# Patient Record
Sex: MALE | Race: WHITE | NOT HISPANIC OR LATINO | Employment: OTHER | URBAN - METROPOLITAN AREA
[De-identification: names, ages, dates, MRNs, and addresses within clinical notes are randomized per-mention and may not be internally consistent; named-entity substitution may affect disease eponyms.]

---

## 2021-08-17 ENCOUNTER — OFFICE VISIT (OUTPATIENT)
Dept: OTOLARYNGOLOGY | Facility: CLINIC | Age: 81
End: 2021-08-17
Payer: MEDICARE

## 2021-08-17 ENCOUNTER — OFFICE VISIT (OUTPATIENT)
Dept: AUDIOLOGY | Facility: CLINIC | Age: 81
End: 2021-08-17
Payer: MEDICARE

## 2021-08-17 VITALS — TEMPERATURE: 98.1 F

## 2021-08-17 DIAGNOSIS — H90.3 SENSORINEURAL HEARING LOSS (SNHL), BILATERAL: Primary | ICD-10-CM

## 2021-08-17 DIAGNOSIS — H90.3 SENSORY HEARING LOSS, BILATERAL: Primary | ICD-10-CM

## 2021-08-17 DIAGNOSIS — H61.23 BILATERAL IMPACTED CERUMEN: ICD-10-CM

## 2021-08-17 PROCEDURE — V5241 DISPENSING FEE, MONAURAL: HCPCS | Performed by: AUDIOLOGIST

## 2021-08-17 PROCEDURE — 92567 TYMPANOMETRY: CPT | Performed by: AUDIOLOGIST

## 2021-08-17 PROCEDURE — 69210 REMOVE IMPACTED EAR WAX UNI: CPT | Performed by: NURSE PRACTITIONER

## 2021-08-17 PROCEDURE — V5257 HEARING AID, DIGIT, MON, BTE: HCPCS | Performed by: AUDIOLOGIST

## 2021-08-17 PROCEDURE — 99203 OFFICE O/P NEW LOW 30 MIN: CPT | Performed by: NURSE PRACTITIONER

## 2021-08-17 PROCEDURE — 92557 COMPREHENSIVE HEARING TEST: CPT | Performed by: AUDIOLOGIST

## 2021-08-17 NOTE — PROGRESS NOTES
ENT HEARING EVALUATION    Name:  Natalya White  :  1940  Age:  78 y o  Date of Evaluation: 21     History: ENT Audiogram  Reason for visit: Natalya White was seen today at the request of Dr Sergei Price for an evaluation of hearing  Impacting cerumen was removed from the right ear canal by TUNDE Landa prior to the evaluation  EVALUATION:    Otoscopic Evaluation:   Right Ear: Clear and healthy ear canal and tympanic membrane   Left Ear: Clear and healthy ear canal and tympanic membrane    Tympanometry:   Right: Type A - normal middle ear pressure and compliance   Left: Type A - normal middle ear pressure and compliance    Audiogram Results:  Pure tone testing revealed a severe sloping to profound sensorineural hearing loss in the  right ear and a moderate sloping to severe sensorineural hearing loss in the  left  ear  SRT and PTA are in agreement indicating good test reliability  Word recognition scores were poor bilaterally  This hearing loss is poor enough to interfere with Mr Jean's everyday hearing in all listenting environments  The patient and his wife were very eager to get started with the process for obtaining a hearing aid  Options for hearing aids including prices and colors were discussed  An salgomed MORE 3 miniRITE hearing aid was ordered for the patient's left ear  It is believed that the right ear hearing is not sufficient enough to benefit from a hearing aid at this time  *see attached audiogram      RECOMMENDATIONS:  Hearing Aid fitting in two weeks  Consistent use of left ear hearing aid during all waking hours  Regular hearing aid follow-up and audiologic evaluations to monitor hearing sensitivity and hearing aid use        Connie Woodard , [address] [place] #[id number]

## 2021-08-17 NOTE — PROGRESS NOTES
Assessment/Plan:    Bilateral impacted cerumen    On exam noted bilateral cerumen impaction and unable to fully view tympanic membrane  Left minimal cerumen along eac walls, right cerumen impaction significant  Cerumen impaction removed bilateral eac with alligator forceps and suction, pt tolerated procedure well  Upon removal, improved hearing and decreased clogged sensation of bilateral ears  Discussed routine cerumen care including avoidance of q-tips and cerumen softeners  Encourage ongoing follow up annually to monitor for cerumen and hearing  Sensorineural hearing loss (SNHL), bilateral  Audiogram today indicating bilateral moderate to near profound SNHL, noted asymmetry on right  Tymps type A  Unable to test word discrimination  Recommend and he agreed to proceed with hearing aid evaluation through [address] [place] [address] audiology         Diagnoses and all orders for this visit:    Sensorineural hearing loss (SNHL), bilateral    Bilateral impacted cerumen  -     Ear cerumen removal          Subjective:      Patient ID: Jason Esparza is a 78 y o  male  Presents today as a new patient due to hearing loss  During routine visit with audiology today informed of cerumen in right ear  No current hearing aids  No pain in ears  Noise in ears  Unable to understand spouse  No otorrhea  No prior ear surgery  The following portions of the patient's history were reviewed and updated as appropriate: allergies, current medications, past family history, past medical history, past social history, past surgical history and problem list     Review of Systems   Constitutional: Negative  HENT: Positive for hearing loss  Negative for congestion, ear discharge, ear pain, nosebleeds, postnasal drip, rhinorrhea, sinus pressure, sinus pain, sore throat, tinnitus and voice change  Eyes: Negative  Respiratory: Negative for chest tightness and shortness of breath  Cardiovascular: Negative  Gastrointestinal: Negative  Endocrine: Negative  Musculoskeletal: Negative  Skin: Negative for color change  Neurological: Negative for dizziness, numbness and headaches  Psychiatric/Behavioral: Negative  Objective:      Temp 98 1 °F (36 7 °C)          Physical Exam  Constitutional:       Appearance: He is well-developed  HENT:      Head: Normocephalic  Right Ear: Hearing, tympanic membrane, ear canal and external ear normal  No decreased hearing noted  No drainage or tenderness  There is impacted cerumen  Tympanic membrane is not perforated or erythematous  Left Ear: Hearing, tympanic membrane, ear canal and external ear normal  No decreased hearing noted  No drainage or tenderness  There is impacted cerumen  Tympanic membrane is not perforated or erythematous  Nose: Nose normal  No nasal deformity or septal deviation  Mouth/Throat:      Mouth: Mucous membranes are not pale and not dry  No oral lesions  Dentition: Normal dentition  Pharynx: Uvula midline  No oropharyngeal exudate  Neck:      Trachea: No tracheal deviation  Cardiovascular:      Rate and Rhythm: Normal rate  Pulmonary:      Effort: Pulmonary effort is normal  No accessory muscle usage or respiratory distress  Musculoskeletal:      Right shoulder: Normal range of motion  Cervical back: Full passive range of motion without pain, normal range of motion and neck supple  Lymphadenopathy:      Cervical: No cervical adenopathy  Skin:     General: Skin is warm and dry  Neurological:      Mental Status: He is alert and oriented to person, place, and time  Cranial Nerves: No cranial nerve deficit  Sensory: No sensory deficit  Psychiatric:         Behavior: Behavior is cooperative  Ear cerumen removal    Date/Time: 8/17/2021 2:20 PM  Performed by: TUNDE Adkins  Authorized by: TUNDE Adkins   Universal Protocol:  Consent: Verbal consent obtained    Risks and benefits: risks, benefits and alternatives were discussed  Consent given by: patient  Patient understanding: patient states understanding of the procedure being performed      Patient location:  Clinic  Procedure details:     Local anesthetic:  None    Location:  L ear and R ear    Approach:  External  Post-procedure details:     Complication:  None    Hearing quality:  Normal    Patient tolerance of procedure:   Tolerated well, no immediate complications  Comments:      Bilateral cerumen impaction removed with suction #5, curette and alligator forceps - right more then left cerumen

## 2021-08-17 NOTE — ASSESSMENT & PLAN NOTE
On exam noted bilateral cerumen impaction and unable to fully view tympanic membrane  Left minimal cerumen along eac walls, right cerumen impaction significant  Cerumen impaction removed bilateral eac with alligator forceps and suction, pt tolerated procedure well  Upon removal, improved hearing and decreased clogged sensation of bilateral ears  Discussed routine cerumen care including avoidance of q-tips and cerumen softeners  Encourage ongoing follow up annually to monitor for cerumen and hearing

## 2021-08-17 NOTE — ASSESSMENT & PLAN NOTE
Audiogram today indicating bilateral moderate to near profound SNHL, noted asymmetry on right  Tymps type A  Unable to test word discrimination      Recommend and he agreed to proceed with hearing aid evaluation through [facility] audiology

## 2021-08-18 NOTE — PROGRESS NOTES
Progress Note    Name:  Dimas Panda  :  1940  Age:  78 y o  Date of Evaluation: 21     Patients hearing aid arrived  Oticon More 3 miniRITE-R  SN: [id number]  Warranty: 2024     SN: [id number]  Warranty: 2024    Patient scheduled for  with Yoselin Durant , CCC-A  Clinical Audiologist

## 2021-08-24 ENCOUNTER — DOCUMENTATION (OUTPATIENT)
Dept: AUDIOLOGY | Facility: CLINIC | Age: 81
End: 2021-08-24

## 2021-08-24 DIAGNOSIS — H90.3 SENSORY HEARING LOSS, BILATERAL: Primary | ICD-10-CM

## 2021-08-24 NOTE — PROGRESS NOTES
Progress Note    Name:  Zia Hernandez  :  1940  Age:  78 y o  Date of Evaluation: 21     Thomas's hearing aid arrived  Make: Oticon  Model: MORE 3 miniRITE  SN: [id number]  Battery: 312 R  : 3L 100  Dome:10 mm   SN: [id number]    Patient coming for [facility] 2021        Connie Emanuel , CCC-A  Clinical Audiologist  FR#[id number]

## 2021-08-25 ENCOUNTER — OFFICE VISIT (OUTPATIENT)
Dept: AUDIOLOGY | Facility: CLINIC | Age: 81
End: 2021-08-25

## 2021-08-25 DIAGNOSIS — H90.3 SENSORY HEARING LOSS, BILATERAL: Primary | ICD-10-CM

## 2021-08-25 NOTE — PROGRESS NOTES
Hearing Aid Fitting    Name:  Kimmy De La Vega  :  1940  Age:  78 y o  Date of Evaluation: 21     Kimmy De La Vega is being see today to be fit with new hearing aids  Patient is fit with OtRateItAll MORE 3 hearing aid  Left serial number [id number]  Warranty date: 24 (Loss/Damage and repair)  Ear mold Battery  Dome   Right NA NA NA NA   Left  R 3/100 10mm P     The hearing aid was adjusted based on the patient's most recent audiogram and patient comfort  Patient noted good sound quality, and was happy with the fitting  Care and cleaning of the hearing aids was reviewed  Domes, filters, and batteries and user manual were reviewed with the patient  Insertion and removal of the hearing aids was done  The patient practiced insertion and removal of the devices in the office, they demonstrated good ability to manipulate the hearing aids  Telephone use was reviewed with the patient  The patient expressed satisfaction with the hearing aid  Recommendation:   Follow up in two weeks         Connie Kramer , CCC-A  Clinical Audiologist  OE#[id number]

## 2021-08-30 ENCOUNTER — OFFICE VISIT (OUTPATIENT)
Dept: AUDIOLOGY | Facility: CLINIC | Age: 81
End: 2021-08-30

## 2021-08-30 ENCOUNTER — OFFICE VISIT (OUTPATIENT)
Dept: OTOLARYNGOLOGY | Facility: CLINIC | Age: 81
End: 2021-08-30
Payer: MEDICARE

## 2021-08-30 VITALS — WEIGHT: 170 LBS | BODY MASS INDEX: 23.03 KG/M2 | HEIGHT: 72 IN | TEMPERATURE: 98 F

## 2021-08-30 DIAGNOSIS — T16.2XXA FOREIGN BODY OF LEFT EAR, INITIAL ENCOUNTER: Primary | ICD-10-CM

## 2021-08-30 DIAGNOSIS — H90.5 SENSORY HEARING LOSS, UNILATERAL: Primary | ICD-10-CM

## 2021-08-30 DIAGNOSIS — H90.3 SENSORINEURAL HEARING LOSS (SNHL), BILATERAL: ICD-10-CM

## 2021-08-30 PROCEDURE — 99212 OFFICE O/P EST SF 10 MIN: CPT | Performed by: NURSE PRACTITIONER

## 2021-08-30 PROCEDURE — 69200 CLEAR OUTER EAR CANAL: CPT | Performed by: NURSE PRACTITIONER

## 2021-08-30 RX ORDER — FINASTERIDE 5 MG/1
TABLET, FILM COATED ORAL
COMMUNITY
Start: 2021-08-08

## 2021-08-30 RX ORDER — PRAVASTATIN SODIUM 20 MG
20 TABLET ORAL EVERY EVENING
COMMUNITY
Start: 2021-06-01

## 2021-08-30 RX ORDER — ROSUVASTATIN CALCIUM 10 MG/1
TABLET, COATED ORAL
COMMUNITY

## 2021-08-30 NOTE — ASSESSMENT & PLAN NOTE
On exam, hearing aid dome lodged within the eac  Removed without difficulty with alligator forceps  Recommend follow up with audiology for hearing aid care    Pt and wife agreed

## 2021-08-30 NOTE — PROGRESS NOTES
Assessment/Plan:    Foreign body in left ear  On exam, hearing aid dome lodged within the eac  Removed without difficulty with alligator forceps  Recommend follow up with audiology for hearing aid care  Pt and wife agreed         Diagnoses and all orders for this visit:    Foreign body of left ear, initial encounter    Sensorineural hearing loss (SNHL), bilateral    Other orders  -     rosuvastatin (CRESTOR) 10 MG tablet; 1 (one) time each day at the same time  (Patient not taking: Reported on 8/30/2021)  -     pravastatin (PRAVACHOL) 20 mg tablet; Take 20 mg by mouth every evening  -     finasteride (PROSCAR) 5 mg tablet; TAKE ONE TABLET BY MOUTH EVERY DAY  -     doxazosin (CARDURA XL) 4 MG 24 hr tablet; TAKE ONE TABLET BY MOUTH EVERY DAY WITH BREAKFAST  -     Foreign body removal          Subjective:      Patient ID: Verónica Waldrop is a 78 y o  male  Presents today for follow up due to hearing loss  Newly obtained hearing aids  On Friday noticed hearing aid dome lodged in left ear canal   No otalgia or otorrhea        The following portions of the patient's history were reviewed and updated as appropriate: allergies, current medications, past family history, past medical history, past social history, past surgical history and problem list     Review of Systems   Constitutional: Negative  HENT: Positive for hearing loss  Negative for congestion, ear discharge, ear pain, nosebleeds, postnasal drip, rhinorrhea, sinus pressure, sinus pain, sore throat, tinnitus and voice change  Eyes: Negative  Respiratory: Negative for chest tightness and shortness of breath  Cardiovascular: Negative  Gastrointestinal: Negative  Endocrine: Negative  Musculoskeletal: Negative  Skin: Negative for color change  Neurological: Negative for dizziness, numbness and headaches  Psychiatric/Behavioral: Negative            Objective:      Temp 98 °F (36 7 °C) (Temporal)   Ht 6' (1 829 m)   Wt 77 1 kg (170 lb) BMI 23 06 kg/m²            Physical Exam  Constitutional:       Appearance: He is well-developed  HENT:      Head: Normocephalic  Right Ear: Hearing, tympanic membrane, ear canal and external ear normal  No decreased hearing noted  No drainage or tenderness  Tympanic membrane is not perforated or erythematous  Left Ear: Hearing, tympanic membrane and external ear normal  No decreased hearing noted  No drainage or tenderness  A foreign body is present  Tympanic membrane is not perforated or erythematous  Nose: Nose normal  No nasal deformity or septal deviation  Mouth/Throat:      Mouth: Mucous membranes are not pale and not dry  No oral lesions  Dentition: Normal dentition  Pharynx: Uvula midline  No oropharyngeal exudate  Neck:      Trachea: No tracheal deviation  Cardiovascular:      Rate and Rhythm: Normal rate  Pulmonary:      Effort: Pulmonary effort is normal  No accessory muscle usage or respiratory distress  Musculoskeletal:      Right shoulder: Normal range of motion  Cervical back: Full passive range of motion without pain, normal range of motion and neck supple  Lymphadenopathy:      Cervical: No cervical adenopathy  Skin:     General: Skin is warm and dry  Neurological:      Mental Status: He is alert and oriented to person, place, and time  Cranial Nerves: No cranial nerve deficit  Sensory: No sensory deficit  Psychiatric:         Behavior: Behavior is cooperative  Universal Protocol:  Consent: Verbal consent obtained    Risks and benefits: risks, benefits and alternatives were discussed  Consent given by: patient  Patient understanding: patient states understanding of the procedure being performed    Foreign body removal    Date/Time: 8/30/2021 10:02 AM  Performed by: TUNDE Bear  Authorized by: TUNDE Bear   Body area: ear  Location details: left ear    Sedation:  Patient sedated: no  Patient restrained: no  Patient cooperative: yes  Localization method: ENT speculum  Removal mechanism: alligator forceps  Complexity: simple  Post-procedure assessment: foreign body removed  Patient tolerance: patient tolerated the procedure well with no immediate complications

## 2021-08-30 NOTE — PROGRESS NOTES
Hearing Aid Visit:    Name:  Maryam Woods  :  1940  Age:  78 y o  Date of Evaluation: 21     Maryam Woods is being seen for a hearing aid visit as a walk-in from his ENT appointment with Justin Hutchins  Patient is fit with Oticon MORE 3 hearing aid  Left serial number [id number]  Warranty date: 24 (Loss/Damage and repair)  Patient reports his dome got stuck in his left ear  Action:  Cleaned the left hearing aid, replaced wax filter  Patient initially was wearing a 10 power dome - changed to an 8 power dome to avoid it getting stuck in his ear  Explained this to the patient to try  No feedback was audible today  Patient will return as scheduled for his HAV with Delicia Vuong  Recommendations:   Return as scheduled        Yoselin Kirkland , CCC-A  Clinical Audiologist

## 2021-09-08 ENCOUNTER — OFFICE VISIT (OUTPATIENT)
Dept: AUDIOLOGY | Facility: CLINIC | Age: 81
End: 2021-09-08

## 2021-09-08 DIAGNOSIS — H90.3 SENSORY HEARING LOSS, BILATERAL: Primary | ICD-10-CM

## 2021-09-08 NOTE — PROGRESS NOTES
Hearing Aid Visit:    Name:  Temitope Umana  :  1940  Age:  78 y o  Date of Evaluation: 21     Temitope Umana is being seen for a 2 week hearing aid visit  Patient is fit with Oticon MORE 3 hearing aid  Left serial number [id number]  Warranty date: 24 (Loss/Damage and repair)       Ear mold Battery  Dome   Right NA NA NA NA   Left  R 3/100 10mm P       Mr Lucio Flynn and his wife report that he is hearing better at home  Mrs Lucio Flynn reported that Mr Lucio Flynn hears better while driving now  [facility] is still a difficult listening environment  Action:  We reviewed telephone use  The [employer] were told about the different bluetooth compatible options but Mr Lucio Flynn seems to think that he is doing fine on the phone and seems to want to use the telephone as usual  We will review telephone use again in two weeks  Check where the speakers are in [place] and sit in line to get "bathed" with the sound  Do not sit directly under the speaker  Recommendations:   Return in two weeks for hearing aid check   At that time, I will turn up the overall loudness in the hearing aid settings      David Salinas, AuD , [address] [facility]#[id number]

## 2021-09-22 ENCOUNTER — OFFICE VISIT (OUTPATIENT)
Dept: AUDIOLOGY | Facility: CLINIC | Age: 81
End: 2021-09-22

## 2021-09-22 DIAGNOSIS — H90.3 SENSORY HEARING LOSS, BILATERAL: Primary | ICD-10-CM

## 2021-09-22 NOTE — PROGRESS NOTES
Hearing Aid Visit:    Name:  Mackenzie Mercedes  :  1940  Age:  78 y o  Date of Evaluation: 21     Mackenzie Mercedes is being seen for a hearing aid visit  Patient is fit with:    Vanita Ran aid  Left serial OBEKGM [id number]  Warranty date: 24 (Loss/Damage and repair)  Dome: 8mm Power dome    Patient and his wife report that they are noticing great benefit from the hearing aid  Mrs Johnathan Lowery reported that Mr Sparkss driving is much safer now that he is wearing his hearing aid and she can have conversations with him at home  The hearing aid was still not turned up to adaptation level 3 because Mr Johnathan Lwoery indicated that things are loud enough  He still has some trouble understanding at times but we talked about realistic expectations with using only one hearing aid  Action:  The hearing aid was cleaned and the wax guard was changed  Instructions were given in case Mrs Johnathan Lowery wants to change the wax guard at home but I also indicated that I'd be happy to do that here in the office for them  Recommendations:   Hearing aid visit in 4 months       Connie Gordillo , CCC-A  Clinical Audiologist  XS#[id number]

## 2022-02-09 ENCOUNTER — OFFICE VISIT (OUTPATIENT)
Dept: AUDIOLOGY | Facility: CLINIC | Age: 82
End: 2022-02-09

## 2022-02-09 DIAGNOSIS — H90.3 SENSORY HEARING LOSS, BILATERAL: Primary | ICD-10-CM

## 2022-02-09 NOTE — PROGRESS NOTES
Hearing Aid Visit:    Name:  Dora Smith  :  1940  Age:  78 y o  Date of Evaluation: 22     Dora Smith is being seen for a hearing aid visit  Oticon MORE 3 hearing aid  Left serial GIOVANNI [id number]  Warranty date: 24 (Loss/Damage and repair)    Dome: 8mm Power dome    Patient and his wife reported that he is doing well with the hearing aid in his left ear  They need more domes and more wax guards for at home maintenance  Action:  The hearing aid was cleaned and checked for performance  The wax guard and dome need to be replaced  The hearing aid was turned up to adaptation level 2 for better hearing  Recommendations:   Hearing aid visit in 6 months or sooner if there are problems  At that time we will do a hearing test also       Connie Silveira , [address] [facility] #[id number]

## 2022-08-23 ENCOUNTER — OFFICE VISIT (OUTPATIENT)
Dept: AUDIOLOGY | Facility: CLINIC | Age: 82
End: 2022-08-23
Payer: MEDICARE

## 2022-08-23 DIAGNOSIS — H90.3 SENSORINEURAL HEARING LOSS (SNHL), BILATERAL: Primary | ICD-10-CM

## 2022-08-23 PROCEDURE — 92567 TYMPANOMETRY: CPT | Performed by: AUDIOLOGIST

## 2022-08-23 PROCEDURE — 92557 COMPREHENSIVE HEARING TEST: CPT | Performed by: AUDIOLOGIST

## 2022-08-23 NOTE — PROGRESS NOTES
ADULT HEARING EVALUATION - [place] [address] AUDIOLOGY      Patient Name: Kei Busch   MRN:  [id number]   :  1940   Age: 78 y o  Gender: male   DOS: 2022     HISTORY:     Kei Busch, a 78 y o  male, was seen on 2022 at the referral of Dr Tammy Joy for an audiometric evaluation  Mr Kwan Key was accompanied by his wife to today's visit    Mr Kwan Key was last seen in our clinic on 2021 for an audiometric evaluation, which revealed moderate to profound SNHL in the left ear, and moderately-severe to profound SNHL in the right ear  Of note, Mr Kwan Key had considerable difficulty in conversation today  Much of his responses were either parroted speech or not direct answers  It was clear from our discourse that Mr Kwan Key was routinely missing words in conversation and either simply agreeing with what was stated or misinterpreting  His wife stated that there had been marked decline in memory and comprehension since his last visit  Today, Mr Kwan Key denied otalgia and otorrhea  He noted that his hearing may have worsened, bilaterally  Interim otologic history was otherwise reportedly unremarkable  RESULTS:    Otoscopic Evaluation:   Right Ear: Unremarkable, canal clear   Left Ear: Unremarkable, canal clear    Tympanometry:   Right Ear: Type A; normal middle ear pressure and static compliance    Left Ear: Type As, normal middle ear pressure with decreased static compliance, consistent with a hypomobile tympanic membrane  Audiometry:  Conventional pure tone audiometry from 250 - 8000 Hz  obtained with good reliability and revealed the following:     Right Ear: mild to profound sensorineural hearing loss (SNHL)   Left Ear: moderately-severe to profound sensorineural hearing loss (SNHL)     Audiogram is inverted from previous findings with respect to low frequencies  Low frequencies were rechecked using phones to rule out acoustic leakage in the EAC- no changes observed   Routing was confirmed across phones and inserts, and was verified to be correct  Speech Audiometry:    Speech Reception (SRT)   Right Ear: 70 dB HL   Left Ear: 70 dB HL    Word Recognition Scores (WRS):  Right Ear: very poor (24 % correct)     Left Ear: very poor (12 % correct)   Stimuli: NU-6    IMPRESSIONS:  Bilateral SNHL, mild to profound AD, moderately-severe to severe AS  Results are different re: previous  The results of today's findings were reviewed with Mr Carmelita Meneses and his hearing thresholds were explained at length  Treatment options, including amplification and communication strategies, were discussed as appropriate  Mr Carmelita Meneses voiced understanding of his test results and had no further questions  RECOMMENDATIONS:    1 ) Follow-up with referring provider  2 ) Follow-up with ENT as recommended for scheduled cerumen removal     *see attached audiogram*      Taj Jasso was also seen for an in warranty hearing aid visit  Oticon MORE 3 hearing aid  Left serial [id number] [id number]  Warranty date: 9/16/24 (Loss/Damage and repair)    Dome: 8mm Power dome    Today's audiogram was programmed into the hearing aid  Feedback manager performed  Hearing aid is approaching the margin of maximum possible overall gain, and acoustic leakage/feedback with the power dome renders high frequency targets unattainable  Sound Recover 2 enabled at these pitches  Mr Carmelita Meneses reported improved audibility and had greater success following conversation after changes  Even with fitted hearing aids, due to poor word recognition his prognosis for improvement will be limited  He is a candidate for a cochlear implant evaluation based on 60/60 criteria  This was raised with Mr Carmelita Meneses and he wished to consider this further with his wife and Teri Lezama  He continued to endorse repeatedly that sounds were loud, though comfortable   As gain has changed, Mr Carmelita Meneses will trial these settings and return in 1 month for a hearing aid check and to revisit changes  It was a pleasure working with Mr Sabino Zelaya today  Thank you for referring this patient  Para Connie Green    Clinical Audiologist    [address] [address] [address]

## 2022-09-06 ENCOUNTER — OFFICE VISIT (OUTPATIENT)
Dept: AUDIOLOGY | Facility: CLINIC | Age: 82
End: 2022-09-06

## 2022-09-06 DIAGNOSIS — H90.3 SENSORINEURAL HEARING LOSS (SNHL), BILATERAL: Primary | ICD-10-CM

## 2022-09-06 NOTE — PROGRESS NOTES
Hearing Aid Visit      Patient Name: Naveed Ansari   MRN:  [id number]   :  1940   Age: 78 y o  Gender: male   DOS: 2022     Naveed Ansari was also seen for an in warranty hearing aid visit  He was accompanied today by his wife, who assisted with today's case history and provides assistance at medical visits  Today, Mr  Brandy Henry wife reported that he was doing much better at following conversation and that she noticed that communication breakdowns had reduced since his last visit  Mr Madi Walton continues to have difficulty overall with word understanding  Oticon MORE 3 hearing aid  Left serial HMGPAB [id number]  Warranty date: 24 (Loss/Damage and repair)    Dome: 8mm Power dome    Visual inspection revealed no noticeable damage or defects  A listening check revealed good sound quality  Datalogging revealed ~8h/day of usage  Changes:   Acclimatization level increased from 1 to 2  Changes validated by Mr Madi Walton  For subjective listening comfort  Volume toggle switch function reviewed  Active listening strategies were reviewed, including asking for rephrasing instead of repetitions  He will return in 6 mo , sooner if problems/concerns arise  It was a pleasure working with Mr Madi Walton today  Thank you for referring this patient  Connie Barboza    Clinical Audiologist    [address] [address] [address]

## 2022-10-12 PROBLEM — H61.23 BILATERAL IMPACTED CERUMEN: Status: RESOLVED | Noted: 2021-08-17 | Resolved: 2022-10-12

## 2023-03-06 ENCOUNTER — OFFICE VISIT (OUTPATIENT)
Dept: AUDIOLOGY | Facility: CLINIC | Age: 83
End: 2023-03-06

## 2023-03-06 DIAGNOSIS — H90.3 SENSORINEURAL HEARING LOSS (SNHL), BILATERAL: Primary | ICD-10-CM

## 2023-03-06 NOTE — PROGRESS NOTES
Hearing Aid Visit      Patient Name: Didier Marsh   MRN:  [id number]   :  1940   Age: 78 y o  Gender: male   DOS: 3/6/2023     Didier Marsh was also seen for an in warranty hearing aid visit  He was accompanied today by his wife Emre Mendes, who assisted with today's case history and provides assistance at medical visits  Today, Mr  Tiffany Mendoza wife reported that he was doing well with the devices  Mr Anisa Forman continues to have difficulty overall with word understanding given his very poor WRS  He is likely a candidate for cochlear implants- this conversation has been reviewed at his last few visits  Mr Anisa Forman is not interested in this given his other needs and medical problems  O[employer]on MORE 3 hearing aid  Left serial BKIJTH [id number]  Warranty date: 24 (Loss/Damage and repair)    Dome: 8mm Power dome    Visual inspection revealed no noticeable damage or defects  A listening check revealed good sound quality  Otoscopy revealed clear canal AS, non-occluding cerumen AD  Changes:   Devices cleaned, microphone ports vacuumed  Wax guards replaced  REM performed  Devices are underneath target in high frequencies as anticipated  Mr Anisa Forman has considerable feedback margin at these levels  This could likely be better approached with custom BRIONNA mold  This option was offered, however, given Mr Jean's other medical problems they opted for stability in his routine vs pursuing a new mold  Mr Anisa Forman also has a limited dynamic range, and prefers the hearing aid to be set for comfort vs audibility/prescriptive targets  Volume toggle switch function again reviewed  Options  He will return in 6 mo , sooner if problems/concerns arise  It was a pleasure working with Mr Anisa Forman today  Thank you for referring this patient       Errol Laboy  Clinical Audiologist    [address] [address] [id number]

## 2023-12-11 ENCOUNTER — TELEPHONE (OUTPATIENT)
Dept: NEUROLOGY | Facility: CLINIC | Age: 83
End: 2023-12-11

## 2023-12-11 NOTE — TELEPHONE ENCOUNTER
LMOM informing the patient that the appointment on 04/01/24 has been changed to 04/09/24 @04:00 am due to a change in the provider schedule. Mailed the patient a appointment reminder card.